# Patient Record
Sex: FEMALE | Race: WHITE | NOT HISPANIC OR LATINO | RURAL
[De-identification: names, ages, dates, MRNs, and addresses within clinical notes are randomized per-mention and may not be internally consistent; named-entity substitution may affect disease eponyms.]

---

## 2017-12-29 ENCOUNTER — OFFICE VISIT (OUTPATIENT)
Dept: RETAIL CLINIC | Facility: CLINIC | Age: 22
End: 2017-12-29

## 2017-12-29 VITALS — TEMPERATURE: 100.1 F | WEIGHT: 143 LBS | RESPIRATION RATE: 18 BRPM

## 2017-12-29 DIAGNOSIS — J10.1 INFLUENZA A: Primary | ICD-10-CM

## 2017-12-29 LAB
EXPIRATION DATE: NORMAL
FLUAV AG NPH QL: NORMAL
FLUBV AG NPH QL: NORMAL
INTERNAL CONTROL: NORMAL
Lab: NORMAL

## 2017-12-29 PROCEDURE — 99213 OFFICE O/P EST LOW 20 MIN: CPT | Performed by: NURSE PRACTITIONER

## 2017-12-29 PROCEDURE — 87804 INFLUENZA ASSAY W/OPTIC: CPT | Performed by: NURSE PRACTITIONER

## 2017-12-29 RX ORDER — OSELTAMIVIR PHOSPHATE 75 MG/1
75 CAPSULE ORAL
Qty: 10 CAPSULE | Refills: 0 | Status: SHIPPED | OUTPATIENT
Start: 2017-12-29 | End: 2018-01-03

## 2018-06-05 ENCOUNTER — OFFICE VISIT (OUTPATIENT)
Dept: RETAIL CLINIC | Facility: CLINIC | Age: 23
End: 2018-06-05

## 2018-06-05 VITALS
OXYGEN SATURATION: 99 % | WEIGHT: 151.8 LBS | HEIGHT: 64 IN | SYSTOLIC BLOOD PRESSURE: 126 MMHG | HEART RATE: 83 BPM | DIASTOLIC BLOOD PRESSURE: 82 MMHG | BODY MASS INDEX: 25.92 KG/M2 | TEMPERATURE: 98.1 F

## 2018-06-05 DIAGNOSIS — K13.0 CHAPPED LIPS: ICD-10-CM

## 2018-06-05 DIAGNOSIS — K13.0 LIP PAIN: Primary | ICD-10-CM

## 2018-06-05 PROCEDURE — 99213 OFFICE O/P EST LOW 20 MIN: CPT | Performed by: NURSE PRACTITIONER

## 2018-06-05 RX ORDER — NYSTATIN 100000 U/G
OINTMENT TOPICAL 2 TIMES DAILY
Qty: 30 G | Refills: 0 | Status: SHIPPED | OUTPATIENT
Start: 2018-06-05 | End: 2018-06-19

## 2018-06-05 NOTE — PROGRESS NOTES
"Subjective   Karissa Araujo is a 23 y.o. female.   /82   Pulse 83   Temp 98.1 °F (36.7 °C) (Oral)   Ht 162.6 cm (64\")   Wt 68.9 kg (151 lb 12.8 oz)   LMP 06/04/2018   SpO2 99%   BMI 26.06 kg/m²       Oral Swelling   This is a new problem. The current episode started more than 1 month ago. The problem occurs constantly. The problem has been waxing and waning. Pertinent negatives include no abdominal pain, anorexia, arthralgias, change in bowel habit, chest pain, chills, congestion, coughing, diaphoresis, fatigue, fever, headaches, joint swelling, myalgias, nausea, neck pain, numbness, rash, sore throat, swollen glands, urinary symptoms, vertigo, visual change, vomiting or weakness. Exacerbated by: sun exposure. Treatments tried: chap stick, stopping use of lip stick, Rx otiment for cut (dont remember name) The treatment provided mild relief.        The following portions of the patient's history were reviewed and updated as appropriate: allergies, current medications, past family history, past medical history, past social history, past surgical history and problem list.    Review of Systems   Constitutional: Negative for chills, diaphoresis, fatigue and fever.   HENT: Negative for congestion and sore throat.    Respiratory: Negative for cough.    Cardiovascular: Negative for chest pain.   Gastrointestinal: Negative for abdominal pain, anorexia, change in bowel habit, nausea and vomiting.   Musculoskeletal: Negative for arthralgias, joint swelling, myalgias and neck pain.   Skin: Negative for rash.   Neurological: Negative for vertigo, weakness, numbness and headaches.       Objective   Physical Exam   Constitutional: She appears well-developed and well-nourished.  Non-toxic appearance. She does not have a sickly appearance.   HENT:   Mouth/Throat:       Psychiatric: She has a normal mood and affect. Her speech is normal and behavior is normal.   Vitals reviewed.      Assessment/Plan   Karissa was seen " today for pain.    Diagnoses and all orders for this visit:    Lip pain    Chapped lips    Other orders  -     mupirocin (BACTROBAN) 2 % ointment; Apply  topically 2 (Two) Times a Day for 7 days.  -     nystatin (MYCOSTATIN) 249267 UNIT/GM ointment; Apply  topically 2 (Two) Times a Day for 14 days.

## 2021-04-22 ENCOUNTER — OFFICE VISIT (OUTPATIENT)
Dept: OBSTETRICS AND GYNECOLOGY | Facility: CLINIC | Age: 26
End: 2021-04-22

## 2021-04-22 VITALS
SYSTOLIC BLOOD PRESSURE: 122 MMHG | DIASTOLIC BLOOD PRESSURE: 84 MMHG | HEIGHT: 66 IN | WEIGHT: 158 LBS | BODY MASS INDEX: 25.39 KG/M2

## 2021-04-22 DIAGNOSIS — Z01.419 PAP TEST, AS PART OF ROUTINE GYNECOLOGICAL EXAMINATION: Primary | ICD-10-CM

## 2021-04-22 DIAGNOSIS — N90.89 SKIN TAG OF VULVA: ICD-10-CM

## 2021-04-22 DIAGNOSIS — N94.3 PMS (PREMENSTRUAL SYNDROME): ICD-10-CM

## 2021-04-22 PROCEDURE — 99213 OFFICE O/P EST LOW 20 MIN: CPT | Performed by: NURSE PRACTITIONER

## 2021-04-22 PROCEDURE — 56501 DESTROY VULVA LESIONS SIM: CPT | Performed by: NURSE PRACTITIONER

## 2021-04-22 PROCEDURE — 99385 PREV VISIT NEW AGE 18-39: CPT | Performed by: NURSE PRACTITIONER

## 2021-04-22 PROCEDURE — 11200 RMVL SKIN TAGS UP TO&INC 15: CPT | Performed by: NURSE PRACTITIONER

## 2021-04-22 RX ORDER — BENZOYL PEROXIDE 10 G/100G
GEL TOPICAL DAILY
COMMUNITY

## 2021-04-22 RX ORDER — CLINDAMYCIN PHOSPHATE 10 MG/G
GEL TOPICAL 2 TIMES DAILY
COMMUNITY

## 2021-04-22 RX ORDER — TRETINOIN 0.5 MG/G
CREAM TOPICAL NIGHTLY
COMMUNITY

## 2021-04-22 NOTE — PROGRESS NOTES
GYN Annual Exam     CC - Here for annual exam.        HPI  Karissa Araujo is a 25 y.o. female, No obstetric history on file., who presents for annual well woman exam. Patient's last menstrual period was 04/01/2021..  Periods are irregular-states can having bleeding every other week. lmp 4/2021-states prior had no bleeding x2 months.  Dysmenorrhea:severe, occurring premenstrually and throughout menses.  Patient reports problems with: PMS-c/o mood swings 1 week prior to periods.  There were no changes to her medical or surgical history since her last visit.. Partner Status: Marital Status: single.  She is sexually active. She has had new partners since her last STD testing. She does desire STD testing.  patient taking lo loestrin x1 year-c/o continued BTB-admits to missing pills at times. She is also c/o severe mood swings 1 week prior to periods-she is asking to discuss option. States previously took sprintec but caused nausea. Last GYN started her on lo loestrin thinking it would help manage her moods.     She is c/o possible skin tags/genital warts-noticed 2 months ago.     Additional OB/GYN History   Current contraception: contraceptive methods: OCP (estrogen/progesterone)  Desires to: discuss options to manage periods contraception  Last Pap :   Last Completed Pap Smear       Status Date      PAP SMEAR No completions recorded        History of abnormal Pap smear: no  Family history of uterine, colon, breast, or ovarian cancer: yes - ovarian CA in her PGM  Performs monthly Self-Breast Exam: no  Exercises Regularly:yes  Feelings of Anxiety or Depression: no  Tobacco Usage?: No   OB History    No obstetric history on file.         Health Maintenance   Topic Date Due   • Annual Gynecologic Pelvic and Breast Exam  Never done   • ANNUAL PHYSICAL  Never done   • HPV VACCINES (1 - 2-dose series) Never done   • COVID-19 Vaccine (1) Never done   • TDAP/TD VACCINES (1 - Tdap) Never done   • HEPATITIS C SCREENING  Never done  "  • PAP SMEAR  Never done   • INFLUENZA VACCINE  08/01/2021   • Pneumococcal Vaccine 0-64  Aged Out       The additional following portions of the patient's history were reviewed and updated as appropriate: allergies, current medications, past family history, past medical history, past social history and past surgical history.    Review of Systems   Constitutional: Negative.    Respiratory: Negative.    Cardiovascular: Negative.    Gastrointestinal: Negative.    Endocrine: Negative.    Genitourinary: Positive for menstrual problem.        Mood swings with periods   Neurological: Negative.    Psychiatric/Behavioral: Positive for depressed mood. The patient is nervous/anxious.      All other systems reviewed and are negative.     I have reviewed and agree with the HPI, ROS, and historical information as entered above. Sherine MARTIN Jey, APRN    Objective   /84   Ht 167.6 cm (66\")   Wt 71.7 kg (158 lb)   LMP 04/01/2021   Breastfeeding No   BMI 25.50 kg/m²     Physical Exam  Vitals and nursing note reviewed. Exam conducted with a chaperone present.   Constitutional:       Appearance: She is well-developed.   HENT:      Head: Normocephalic and atraumatic.   Neck:      Thyroid: No thyroid mass or thyromegaly.   Cardiovascular:      Rate and Rhythm: Normal rate and regular rhythm.      Heart sounds: No murmur heard.     Pulmonary:      Effort: Pulmonary effort is normal. No retractions.      Breath sounds: Normal breath sounds. No wheezing, rhonchi or rales.   Chest:      Chest wall: No mass or tenderness.      Breasts:         Right: Normal. No mass, nipple discharge, skin change or tenderness.         Left: Normal. No mass, nipple discharge, skin change or tenderness.   Abdominal:      Palpations: Abdomen is soft. Abdomen is not rigid. There is no mass.      Tenderness: There is no abdominal tenderness. There is no guarding.      Hernia: No hernia is present. There is no hernia in the left inguinal area. "   Genitourinary:     Labia:         Right: Lesion present. No rash or tenderness.         Left: Lesion present. No rash or tenderness.       Vagina: Normal. No vaginal discharge or lesions.      Cervix: No cervical motion tenderness, discharge, lesion or cervical bleeding.      Uterus: Normal. Not enlarged, not fixed and not tender.       Adnexa:         Right: No mass or tenderness.          Left: No mass or tenderness.        Rectum: No external hemorrhoid.          Comments: Left pedunculated vulvar skin tag, right flat vulvar skin tag.  Both flesh-colored  Musculoskeletal:      Cervical back: Normal range of motion. No muscular tenderness.   Neurological:      Mental Status: She is alert and oriented to person, place, and time.   Psychiatric:         Behavior: Behavior normal.       Left vulvar skin tag-topical lidocaine cream was applied and then removed using forceps and scissors.  Hemostasis was achieved with small amount of silver nitrate.  TCA applied to right vulvar skin tag as it was too flat to cut. Pt tolerated the procedure well.      Assessment and Plan    Problem List Items Addressed This Visit        Genitourinary and Reproductive     PMS (premenstrual syndrome)    Overview     Patient reports extreme mood issues the week prior to her menses.  Changed to Lo 4/22/2021.  Follow-up in 3 months.  She denies anxiety or depression outside of this timeframe each month.         Skin tag of vulva    Overview     Left vulvar skin tag-topical lidocaine cream was applied and then removed using forceps and scissors.  Hemostasis was achieved with small amount of silver nitrate.  TCA applied to right vulvar skin tag as it was too flat to cut.         Relevant Medications    tretinoin (RETIN-A) 0.05 % cream    clindamycin (CLINDAGEL) 1 % gel    benzoyl peroxide 10 % gel      Other Visit Diagnoses     Pap test, as part of routine gynecological examination    -  Primary    Relevant Orders    Pap IG, Rfx HPV ASCU     Pap IG, Ct-Ng TV Rfx HPV All        She is unhappy with low Loestrin due to mood changes.  We will change to Lo and reevaluate in 3 months.    1. GYN annual well woman exam.   2. OCP's/Vaginal Ring - Discussed side effects of nausea, BTB, headaches, breast tenderness and slight weight gain in the first three cycles.  Understands risks of blood clots, stroke, and theoretical risk of breast cancer.  Denies family history of blood clots.  3. Reviewed monthly self breast exams.  Instructed to call with lumps, pain, or breast discharge.     4. She declined for vulvar lesion to be sent to pathology, it was consistent with skin tag.  She understands diagnosis cannot be made definitively without pathology.  She can return for repeat TCA to the right vulvar lesion after 2 weeks if needed.      Sherine Khanna, APRN  04/22/2021

## 2021-04-28 DIAGNOSIS — Z01.419 PAP TEST, AS PART OF ROUTINE GYNECOLOGICAL EXAMINATION: ICD-10-CM

## 2021-05-18 RX ORDER — DROSPIRENONE AND ETHINYL ESTRADIOL 0.02-3(28)
1 KIT ORAL DAILY
Qty: 28 TABLET | Refills: 3 | Status: SHIPPED | OUTPATIENT
Start: 2021-05-18 | End: 2021-12-02

## 2021-05-18 NOTE — TELEPHONE ENCOUNTER
Patient in office 4/2021. Discussed switching ocps to ken but pharmacy did not receive. Will resend rx request. She VU. She has 2 weeks left of current pill pack-will start new pills after period.

## 2021-11-02 ENCOUNTER — TELEPHONE (OUTPATIENT)
Dept: OBSTETRICS AND GYNECOLOGY | Facility: CLINIC | Age: 26
End: 2021-11-02

## 2021-11-02 NOTE — TELEPHONE ENCOUNTER
Patient called and would like a refill on her BC. She would also like to discuss the removal of a skin tag located in her vaginal area with a nurse.

## 2021-12-02 ENCOUNTER — OFFICE VISIT (OUTPATIENT)
Dept: OBSTETRICS AND GYNECOLOGY | Facility: CLINIC | Age: 26
End: 2021-12-02

## 2021-12-02 VITALS
SYSTOLIC BLOOD PRESSURE: 120 MMHG | DIASTOLIC BLOOD PRESSURE: 80 MMHG | WEIGHT: 157.4 LBS | BODY MASS INDEX: 25.3 KG/M2 | HEIGHT: 66 IN

## 2021-12-02 DIAGNOSIS — N90.89 SKIN TAG OF VULVA: Primary | ICD-10-CM

## 2021-12-02 DIAGNOSIS — N94.3 PMS (PREMENSTRUAL SYNDROME): ICD-10-CM

## 2021-12-02 DIAGNOSIS — N90.89 VULVAR LESION: ICD-10-CM

## 2021-12-02 PROCEDURE — 99213 OFFICE O/P EST LOW 20 MIN: CPT | Performed by: NURSE PRACTITIONER

## 2021-12-02 PROCEDURE — 11200 RMVL SKIN TAGS UP TO&INC 15: CPT | Performed by: NURSE PRACTITIONER

## 2021-12-02 NOTE — PROGRESS NOTES
Vulvar Biopsy Procedure Note  Procedures     Indications:  Karissa Araujo is a 26 y.o. , who presents today for vulvar biopsy.  The patient was noted to have a left vulvar lesion.   After being presented with the risk, benefits, and specific detail of the procedure, the patient wished to proceed.  Verbal consent was obtained from patient.      Review of Systems   Constitutional: Negative.    Gastrointestinal: Negative.    Genitourinary:        Vulvar lesion          Procedure Details   The patient was placed in the dorsal lithotomy position and the area was prepped with betadine.   The area was numbed using 5% lidocaine ointment. The specimen was removed using forceps and scissors. The specimen was placed in formalin and sent to pathology for evaluation.  Pressure was applied to the biopsy site to control bleeding and no silver nitrate was applied.  Hemostasis was adequate.       There was minimal bleeding.  The patient tolerated the procedure well.     Physical Exam  Constitutional:       Appearance: Normal appearance.   Genitourinary:         Comments: Pedunculated skin colored lesion c/w skin tag  Neurological:      Mental Status: She is alert.           Procedures    Complications: none.        Sherine Khanna, APRN  2021

## 2021-12-02 NOTE — PROGRESS NOTES
"Chief Complaint   Patient presents with   • Follow-up         Subjective   HPI  Karissa Araujo is a 26 y.o. female, , LMP was on 2021 who presents for birth control. Patient stated that she feels bloated and fat when she takes the birth control. Patient stated she feels like the birth control is making her moods worse. Patient stated that she has a skin tag on her vagina. Patient stated that she noticed the new skin tag a month after she got the other skin tags removed.     Her periods are regular every 28-30 days, lasting 5 days.  Dysmenorrhea:severe, occurring premenstrually and first 1-2 days of flow.  Partner Status: Marital Status: single.  The patient's current method of contraception is contraceptive methods: OCP (estrogen/progesterone). New Partners since last visit: no.  Desires STD Screening: no.    Additional OB/GYN History   Last Pap :   Last Completed Pap Smear          PAP SMEAR (Every 3 Years) Next due on 2021  Pap IG, Ct-Ng TV Rfx HPV All              History of abnormal Pap smear: no    OB History        0    Para   0    Term   0       0    AB   0    Living   0       SAB   0    IAB   0    Ectopic   0    Molar   0    Multiple   0    Live Births   0                The additional following portions of the patient's history were reviewed and updated as appropriate: allergies and current medications.    Review of Systems   Constitutional: Negative.    Gastrointestinal: Negative.    Genitourinary:        Vaginal lesion   Psychiatric/Behavioral: Positive for agitation.       I have reviewed and agree with the HPI, ROS, and historical information as entered above. Sherine Khanna, APRN    Objective   /80   Ht 167.6 cm (66\")   Wt 71.4 kg (157 lb 6.4 oz)   LMP 2021   Breastfeeding No   BMI 25.41 kg/m²     Physical Exam  Vitals and nursing note reviewed. Exam conducted with a chaperone present.   Constitutional:       Appearance: Normal " appearance.   Pulmonary:      Effort: Pulmonary effort is normal.   Abdominal:      Palpations: Abdomen is soft.   Genitourinary:     Labia:         Right: No rash, tenderness or lesion.         Left: No rash, tenderness or lesion.       Vagina: Normal. No lesions.      Cervix: No cervical motion tenderness, discharge, lesion or cervical bleeding.      Uterus: Normal. Not enlarged, not fixed and not tender.       Adnexa:         Right: No mass or tenderness.          Left: No mass or tenderness.        Rectum: No external hemorrhoid.          Comments: Skin colored pedunculated lesion noted on the left vulva.  Consistent with skin tag.  Chaperone Present  Neurological:      Mental Status: She is alert.         Assessment/Plan     Assessment     Problem List Items Addressed This Visit        Genitourinary and Reproductive     PMS (premenstrual syndrome)    Overview     Patient reports extreme mood issues the week prior to her menses.  Changed to Ken 4/22/2021.  Follow-up in 3 months.  She denies anxiety or depression outside of this timeframe each month. No improvement on ken. We discussed options for nuva ring, nextellis, or kyleena. She desires kyleena.         Skin tag of vulva - Primary    Overview     Left vulvar skin tag-topical lidocaine cream was applied and then removed using forceps and scissors.  Hemostasis was achieved with small amount of silver nitrate.  TCA applied to right vulvar skin tag as it was too flat to cut.  12/2/21- another left vulvar skin tag was noted and removed, sent to path for confirmation of etiology.         Relevant Medications    tretinoin (RETIN-A) 0.05 % cream    clindamycin (CLINDAGEL) 1 % gel    benzoyl peroxide 10 % gel        Risk/benefit/SE of kyleena reviewed. Brochure was given. Check benefits today. Plan insertion on menses.   This is her third skin tag this year, we will remove and sent to pathology to confirm etiology.  We discussed that tight clothing in that area can  cause frequent skin tags.  No known history of HPV    1. Counseling on alternative methods of birth control provided  2. Counseling on use of IUDs provided      Sherine Khanna, APRN  12/02/2021

## 2021-12-07 DIAGNOSIS — N90.89 VULVAR LESION: ICD-10-CM

## 2022-01-13 ENCOUNTER — OFFICE VISIT (OUTPATIENT)
Dept: OBSTETRICS AND GYNECOLOGY | Facility: CLINIC | Age: 27
End: 2022-01-13

## 2022-01-13 VITALS — HEIGHT: 66 IN | WEIGHT: 156 LBS | BODY MASS INDEX: 25.07 KG/M2

## 2022-01-13 DIAGNOSIS — Z30.430 ENCOUNTER FOR IUD INSERTION: Primary | ICD-10-CM

## 2022-01-13 LAB
B-HCG UR QL: NEGATIVE
EXPIRATION DATE: NORMAL
INTERNAL NEGATIVE CONTROL: NEGATIVE
INTERNAL POSITIVE CONTROL: POSITIVE
Lab: NORMAL

## 2022-01-13 PROCEDURE — 58300 INSERT INTRAUTERINE DEVICE: CPT | Performed by: OBSTETRICS & GYNECOLOGY

## 2022-01-13 PROCEDURE — 81025 URINE PREGNANCY TEST: CPT | Performed by: OBSTETRICS & GYNECOLOGY

## 2022-01-13 NOTE — PROGRESS NOTES
Procedure: IUD Insertion Procedure Note     Procedures    Pre procedure indication 1) Desires Kyleena  Post procedure indication 1) Desires Kyleena  NDC # 22175-439-04  Lot #: [id number]  Exp Date: 11/2023  BH device    Patient's LMP was 01/13/2022  She did have a UPT in office today. The results were Negative.    The risks, benefits, and alternatives to Kyleena were explained at length with the patient. All her questions were answered and consents were signed.    The patient was placed in a dorsal lithotomy position on the examining table in [facility]. A bimanual exam confirmed the uterus was normal in size, 7 cm. A warmed metal speculum was inserted into the vagina and the cervix was brought into view.    The cervix was prepped with Betadine. The anterior lip was grasped with a single-tooth tenaculum. The endometrial cavity was then sounded to 7  cm without use of a dilator. This sealed Kyleena package was opened and the IUD was removed in a sterile fashion.    The upper edge of the depth setting the flange was set at a uterine sound measured. The  was then carefully advanced to the cervical canal into the uterus to the level of the fundus.  The slider was then retracted about 1 cm and deployed the device. The device was then gently advanced to the fundus. The IUD was then released by pulling the slider down all the way. The  was removed carefully from the uterus. The threads were then cut leaving 2-3 cm visible outside of the cervix.  The single-tooth tenaculum was removed from the anterior lip. Good hemostasis was noted.     All other instruments were removed from the vagina.   There were no complications.  The patient tolerated the procedure well with a minimal amount of discomfort.    The patient was counseled about the need to return in 4 weeks with U/S for IUD check.     She was counseled about the need to use a backup method of contraception such as condoms until her post insertion exam was  performed. The patient verbalized understanding that the Kyleena will need to be removed/replaced after 5 years. The patient is counseled to contact us if she has any significant or increasing bleeding, pain, fever, chills, or other concerns. She is instructed to see a doctor right away if she believes that she may be pregnant at any time with the IUD in place.portaable US showed correct placement    Kasi Tyler MD  01/13/2022

## 2022-01-19 ENCOUNTER — TELEPHONE (OUTPATIENT)
Dept: OBSTETRICS AND GYNECOLOGY | Facility: CLINIC | Age: 27
End: 2022-01-19

## 2022-01-19 NOTE — TELEPHONE ENCOUNTER
Pt called said she got an IUD on 1/13/22. She had some questions thinks she may have an infection.

## 2022-01-19 NOTE — TELEPHONE ENCOUNTER
Patient reports symptoms of bruise-like feeling at groin with R lymph node swelling; began 2 days ago. Appointment with [facility] tomorrow at 1pm

## 2022-01-20 ENCOUNTER — OFFICE VISIT (OUTPATIENT)
Dept: OBSTETRICS AND GYNECOLOGY | Facility: CLINIC | Age: 27
End: 2022-01-20

## 2022-01-20 VITALS
HEIGHT: 66 IN | WEIGHT: 159.2 LBS | BODY MASS INDEX: 25.58 KG/M2 | DIASTOLIC BLOOD PRESSURE: 86 MMHG | SYSTOLIC BLOOD PRESSURE: 122 MMHG

## 2022-01-20 DIAGNOSIS — Z30.431 IUD CHECK UP: Primary | ICD-10-CM

## 2022-01-20 DIAGNOSIS — R59.0 INGUINAL LYMPHADENOPATHY: ICD-10-CM

## 2022-01-20 LAB
KOH PREP NAIL: ABNORMAL
WET PREP GENITAL: NORMAL

## 2022-01-20 PROCEDURE — 99213 OFFICE O/P EST LOW 20 MIN: CPT | Performed by: NURSE PRACTITIONER

## 2022-01-20 PROCEDURE — 87210 SMEAR WET MOUNT SALINE/INK: CPT | Performed by: NURSE PRACTITIONER

## 2022-01-20 PROCEDURE — 87220 TISSUE EXAM FOR FUNGI: CPT | Performed by: NURSE PRACTITIONER

## 2022-01-20 RX ORDER — FLUCONAZOLE 150 MG/1
150 TABLET ORAL AS NEEDED
Qty: 2 TABLET | Refills: 0 | Status: SHIPPED | OUTPATIENT
Start: 2022-01-20 | End: 2022-03-17

## 2022-01-20 NOTE — PROGRESS NOTES
"Chief Complaint   Patient presents with   • Follow-up         Subjective   HPI  Karissa Araujo is a 26 y.o. female, , who presents for IUD check follow up.  She had an IUD placed 1 week ago (2022) Her LMP was Patient's last menstrual period was 2022..  Since the IUD placement, the patient stated that she has a bruise like feeling in her groin and a swollen lump which has gotten much smaller. Additional complaints include back pain and cramping which is occasional and has now resolved. Patient stated that she is spotting that is a brownish color. Patient stated that these symptoms happened about 3 days ago and it only happens at night. Patient thinks she has a possible infection.     The additional following portions of the patient's history were reviewed and updated as appropriate: allergies and current medications.    Did the patient have u/s today? No.    Review of Systems   Constitutional: Negative.    Gastrointestinal: Negative.    Genitourinary: Positive for vaginal bleeding.        Inguinal lymphadenopathy     All other systems reviewed and are negative.     I have reviewed and agree with the HPI, ROS, and historical information as entered above. Sherine Khanna, APRN    Objective   /86   Ht 167.6 cm (66\")   Wt 72.2 kg (159 lb 3.2 oz)   LMP 2022   BMI 25.70 kg/m²     Physical Exam  Vitals and nursing note reviewed. Exam conducted with a chaperone present.   Constitutional:       Appearance: Normal appearance.   Pulmonary:      Effort: Pulmonary effort is normal.   Abdominal:      Palpations: Abdomen is soft.   Genitourinary:     Labia:         Right: No rash, tenderness or lesion.         Left: No rash, tenderness or lesion.       Vagina: Bleeding present. No lesions.      Cervix: No cervical motion tenderness, discharge, lesion or cervical bleeding.      Uterus: Normal. Not enlarged, not fixed and not tender.       Adnexa:         Right: No mass or tenderness.          " Left: No mass or tenderness.        Rectum: No external hemorrhoid.      Comments: Single subcentimeter lymph node palpable, mobile, nontender. IUD sting visualized and appropriate, approx 1 inch in length. Chaperone Present  Lymphadenopathy:      Lower Body: Right inguinal adenopathy present.   Neurological:      Mental Status: She is alert.         Assessment/Plan     Assessment     Problem List Items Addressed This Visit     None      Visit Diagnoses     IUD check up    -  Primary    Inguinal lymphadenopathy        Relevant Orders    POC Wet Prep (Completed)    POC KOH Prep (Completed)          1. IUD strings appropriate. + yeast on WP. Denies symptoms but will treat today. STD screen 4/2021 was neg. She declines testing and has not had a new partner. She reports the lymph node has decreased in size significantly and is barely palpable at this appt.     Plan     1. Call or return if symptoms worsen or persist  Return for Annual physical.   Call for severe pain, heavy bleeding, persistent adenopathy.       Sherine Khanna, APRN  01/20/2022

## 2022-03-17 ENCOUNTER — OFFICE VISIT (OUTPATIENT)
Dept: OBSTETRICS AND GYNECOLOGY | Facility: CLINIC | Age: 27
End: 2022-03-17

## 2022-03-17 VITALS
HEIGHT: 66 IN | SYSTOLIC BLOOD PRESSURE: 120 MMHG | BODY MASS INDEX: 24.27 KG/M2 | DIASTOLIC BLOOD PRESSURE: 70 MMHG | WEIGHT: 151 LBS

## 2022-03-17 DIAGNOSIS — N76.0 BV (BACTERIAL VAGINOSIS): Primary | ICD-10-CM

## 2022-03-17 DIAGNOSIS — B96.89 BV (BACTERIAL VAGINOSIS): Primary | ICD-10-CM

## 2022-03-17 DIAGNOSIS — B37.9 YEAST INFECTION: ICD-10-CM

## 2022-03-17 PROCEDURE — 99212 OFFICE O/P EST SF 10 MIN: CPT | Performed by: OBSTETRICS & GYNECOLOGY

## 2022-03-17 RX ORDER — FLUCONAZOLE 150 MG/1
150 TABLET ORAL DAILY
Qty: 2 TABLET | Refills: 2 | Status: SHIPPED | OUTPATIENT
Start: 2022-03-17 | End: 2022-04-26

## 2022-03-17 RX ORDER — METRONIDAZOLE 500 MG/1
500 TABLET ORAL 2 TIMES DAILY
Qty: 14 TABLET | Refills: 4 | Status: SHIPPED | OUTPATIENT
Start: 2022-03-17 | End: 2022-03-24

## 2022-03-17 NOTE — PROGRESS NOTES
"Chief Complaint   Patient presents with   • Contraception     Follow up after placement         Subjective   HPI  Karissa Araujo is a 26 y.o. female, , who presents for IUD check follow up.  She had an IUD placed 1 month ago.  Her LMP was Patient's last menstrual period was 2022..  Since the IUD placement, the patient has not had any unusual complaints. Additional complaints include:irregular bleeding since placement. Patient currently on her period.     The additional following portions of the patient's history were reviewed and updated as appropriate: allergies and current medications.    Did the patient have u/s today? No.    Review of Systems   Constitutional: Negative.    HENT: Negative.    Respiratory: Negative.    Cardiovascular: Negative.    Gastrointestinal: Negative.    Genitourinary: Negative.    Musculoskeletal: Negative.    Skin: Negative.    Allergic/Immunologic: Negative.    Neurological: Negative.    Hematological: Negative.    Psychiatric/Behavioral: Negative.      All other systems reviewed and are negative.     I have reviewed and agree with the HPI, ROS, and historical information as entered above. Kasi Tyler MD    Objective   /70   Ht 167.6 cm (66\")   Wt 68.5 kg (151 lb)   LMP 2022   BMI 24.37 kg/m²   Pelvic exam: normal external genitalia, vulva, vagina, cervix, uterus and adnexa, VAGINA: normal appearing vagina with normal color and discharge, no lesions, vaginal discharge - white, WET MOUNT done - results: clue cells, hyphae, WET MOUNT done - results: clue cells, hyphae.    Physical Exam  Genitourinary:           Comments: IUD string          Assessment/Plan     Assessment     Problem List Items Addressed This Visit    None     Visit Diagnoses     BV (bacterial vaginosis)    -  Primary    Relevant Medications    metroNIDAZOLE (Flagyl) 500 MG tablet    Yeast infection        Relevant Medications    fluconazole (Diflucan) 150 MG tablet        Smear shows minor BV "         Plan     1. Return to office PRN  2. Small BV and we will rx  No follow-ups on file.      Kasi Tyler MD  03/17/2022

## 2022-04-19 ENCOUNTER — TELEPHONE (OUTPATIENT)
Dept: OBSTETRICS AND GYNECOLOGY | Facility: CLINIC | Age: 27
End: 2022-04-19

## 2022-04-19 NOTE — TELEPHONE ENCOUNTER
PT CALLED AND WANTS TO KNOW IF SHE SHOULD KEEP ANNUAL SINCE SHE WAS TOLD SHE DOES NOT NEED IT. PLEASE ADVISE.

## 2022-04-26 ENCOUNTER — OFFICE VISIT (OUTPATIENT)
Dept: OBSTETRICS AND GYNECOLOGY | Facility: CLINIC | Age: 27
End: 2022-04-26

## 2022-04-26 VITALS
SYSTOLIC BLOOD PRESSURE: 116 MMHG | BODY MASS INDEX: 24.23 KG/M2 | WEIGHT: 150.8 LBS | HEIGHT: 66 IN | DIASTOLIC BLOOD PRESSURE: 70 MMHG

## 2022-04-26 DIAGNOSIS — Z01.419 ENCOUNTER FOR ANNUAL ROUTINE GYNECOLOGICAL EXAMINATION: Primary | ICD-10-CM

## 2022-04-26 PROCEDURE — 99395 PREV VISIT EST AGE 18-39: CPT | Performed by: OBSTETRICS & GYNECOLOGY

## 2022-04-26 RX ORDER — DEXTROAMPHETAMINE/AMPHETAMINE 10 MG
CAPSULE, EXT RELEASE 24 HR ORAL
COMMUNITY
Start: 2022-04-21

## 2022-04-26 NOTE — PROGRESS NOTES
GYN Annual Exam     CC - Here for annual exam.     Subjective   HPI  Karissa Araujo is a 26 y.o. female, , who presents for annual well woman exam. Patient's last menstrual period was 2022 (exact date)..  Periods are regular occurring every 25-35 days, lasting 5-7 days.  Dysmenorrhea:moderate  Pre menstrual .  Patient reports problems with: moodiness.  Partner Status: Marital Status: single.  New Partners since last visit: no.  Desires STD Screening: yes.    Additional OB/GYN History   Current contraception: contraceptive methods: Anusha  Desires to: continue contraception  Last Pap : 2021 neg. HPV neg   Last Completed Pap Smear          Ordered - PAP SMEAR (Every 3 Years) Ordered on 2021  Pap IG, Ct-Ng TV Rfx HPV All              History of abnormal Pap smear: no  Family history of uterine, colon, breast, or ovarian cancer: yes - ovarian cancer   Performs monthly Self-Breast Exam: no  Exercises Regularly:yes  Feelings of Anxiety or Depression: no  Tobacco Usage?: No   OB History        0    Para   0    Term   0       0    AB   0    Living   0       SAB   0    IAB   0    Ectopic   0    Molar   0    Multiple   0    Live Births   0                Health Maintenance   Topic Date Due   • Annual Gynecologic Pelvic and Breast Exam  Never done   • ANNUAL PHYSICAL  Never done   • HPV VACCINES (1 - 2-dose series) Never done   • TDAP/TD VACCINES (1 - Tdap) Never done   • HEPATITIS C SCREENING  Never done   • COVID-19 Vaccine (3 - Booster for Pfizer series) 2022   • INFLUENZA VACCINE  2022   • PAP SMEAR  2024   • Pneumococcal Vaccine 0-64  Aged Out       The additional following portions of the patient's history were reviewed and updated as appropriate: allergies, current medications, past family history, past medical history, past social history, past surgical history and problem list.    Review of Systems   All other systems reviewed and are  "negative.      I have reviewed and agree with the HPI, ROS, and historical information as entered above. Kasi Tyler MD    Objective   /70   Ht 167.6 cm (66\")   Wt 68.4 kg (150 lb 12.8 oz)   LMP 04/02/2022 (Exact Date)   Breastfeeding No   BMI 24.34 kg/m²     Physical Exam  Vitals and nursing note reviewed. Exam conducted with a chaperone present.   Constitutional:       Appearance: She is well-developed.   HENT:      Head: Normocephalic and atraumatic.   Neck:      Thyroid: No thyroid mass or thyromegaly.   Cardiovascular:      Rate and Rhythm: Normal rate and regular rhythm.      Heart sounds: No murmur heard.  Pulmonary:      Effort: Pulmonary effort is normal. No retractions.      Breath sounds: Normal breath sounds. No wheezing, rhonchi or rales.   Chest:      Chest wall: No mass or tenderness.   Breasts:      Right: Normal. No mass, nipple discharge, skin change or tenderness.      Left: Normal. No mass, nipple discharge, skin change or tenderness.       Abdominal:      General: Bowel sounds are normal.      Palpations: Abdomen is soft. Abdomen is not rigid. There is no mass.      Tenderness: There is no abdominal tenderness. There is no guarding.      Hernia: No hernia is present. There is no hernia in the left inguinal area.   Genitourinary:     Labia:         Right: No rash, tenderness or lesion.         Left: No rash, tenderness or lesion.       Vagina: Normal. No vaginal discharge or lesions.      Cervix: No cervical motion tenderness, discharge, lesion or cervical bleeding.      Uterus: Normal. Not enlarged, not fixed and not tender.       Adnexa:         Right: No mass or tenderness.          Left: No mass or tenderness.        Rectum: No external hemorrhoid.            Comments: .  Musculoskeletal:      Cervical back: Normal range of motion. No muscular tenderness.   Neurological:      Mental Status: She is alert and oriented to person, place, and time.   Psychiatric:         Behavior: " Behavior normal.         Assessment/Plan     Assessment     Problem List Items Addressed This Visit    None     Visit Diagnoses     Encounter for annual routine gynecological examination    -  Primary    Relevant Orders    Pap IG, Ct-Ng TV Rfx HPV All          1. GYN annual well woman exam.   2. Portable US confirms nl ovaries and IUD is in place    Plan     1. Recommended use of Vitamin D replacement and getting adequate calcium in her diet. (1500mg)  2. Reviewed monthly self breast exams.  Instructed to call with lumps, pain, or breast discharge.    3. Reviewed BMI and weight loss as preventative health measures.   4. RTC in 1 year or PRN with problems      Kasi Tyler MD  04/26/2022

## 2022-05-11 DIAGNOSIS — Z01.419 ENCOUNTER FOR ANNUAL ROUTINE GYNECOLOGICAL EXAMINATION: ICD-10-CM

## 2024-10-29 NOTE — TELEPHONE ENCOUNTER
Last annual with pap smear 4/2021. Needs pap this year. Patient was in office 3/2022 for BV and IUD check.     She vu.    Ambulatory